# Patient Record
Sex: MALE | Race: WHITE | URBAN - METROPOLITAN AREA
[De-identification: names, ages, dates, MRNs, and addresses within clinical notes are randomized per-mention and may not be internally consistent; named-entity substitution may affect disease eponyms.]

---

## 2017-04-07 ENCOUNTER — HOSPITAL ENCOUNTER (EMERGENCY)
Facility: CLINIC | Age: 42
Discharge: HOME | End: 2017-04-07
Attending: EMERGENCY MEDICINE | Admitting: EMERGENCY MEDICINE
Payer: COMMERCIAL

## 2017-04-07 VITALS
HEIGHT: 74 IN | BODY MASS INDEX: 23.03 KG/M2 | WEIGHT: 179.45 LBS | RESPIRATION RATE: 16 BRPM | OXYGEN SATURATION: 100 % | HEART RATE: 84 BPM | SYSTOLIC BLOOD PRESSURE: 140 MMHG | TEMPERATURE: 98.1 F | DIASTOLIC BLOOD PRESSURE: 80 MMHG

## 2017-04-07 DIAGNOSIS — R07.89 ATYPICAL CHEST PAIN: ICD-10-CM

## 2017-04-07 LAB
ALBUMIN SERPL-MCNC: 4.4 G/DL (ref 3.4–5)
ALP SERPL-CCNC: 54 U/L (ref 40–150)
ALT SERPL W P-5'-P-CCNC: 36 U/L (ref 0–70)
ANION GAP SERPL CALCULATED.3IONS-SCNC: 6 MMOL/L (ref 3–14)
AST SERPL W P-5'-P-CCNC: 22 U/L (ref 0–45)
BASOPHILS # BLD AUTO: 0.1 10E9/L (ref 0–0.2)
BASOPHILS NFR BLD AUTO: 0.6 %
BILIRUB SERPL-MCNC: 0.5 MG/DL (ref 0.2–1.3)
BUN SERPL-MCNC: 10 MG/DL (ref 7–30)
CALCIUM SERPL-MCNC: 9 MG/DL (ref 8.5–10.1)
CHLORIDE SERPL-SCNC: 103 MMOL/L (ref 94–109)
CO2 SERPL-SCNC: 32 MMOL/L (ref 20–32)
CREAT SERPL-MCNC: 0.68 MG/DL (ref 0.66–1.25)
D DIMER PPP FEU-MCNC: NORMAL UG/ML FEU (ref 0–0.5)
DIFFERENTIAL METHOD BLD: NORMAL
EOSINOPHIL # BLD AUTO: 0.2 10E9/L (ref 0–0.7)
EOSINOPHIL NFR BLD AUTO: 2.2 %
ERYTHROCYTE [DISTWIDTH] IN BLOOD BY AUTOMATED COUNT: 12.1 % (ref 10–15)
GFR SERPL CREATININE-BSD FRML MDRD: NORMAL ML/MIN/1.7M2
GLUCOSE SERPL-MCNC: 93 MG/DL (ref 70–99)
HCT VFR BLD AUTO: 44.6 % (ref 40–53)
HGB BLD-MCNC: 15.6 G/DL (ref 13.3–17.7)
IMM GRANULOCYTES # BLD: 0 10E9/L (ref 0–0.4)
IMM GRANULOCYTES NFR BLD: 0.1 %
INTERPRETATION ECG - MUSE: NORMAL
LYMPHOCYTES # BLD AUTO: 3.4 10E9/L (ref 0.8–5.3)
LYMPHOCYTES NFR BLD AUTO: 39.7 %
MCH RBC QN AUTO: 30.9 PG (ref 26.5–33)
MCHC RBC AUTO-ENTMCNC: 35 G/DL (ref 31.5–36.5)
MCV RBC AUTO: 88 FL (ref 78–100)
MONOCYTES # BLD AUTO: 0.5 10E9/L (ref 0–1.3)
MONOCYTES NFR BLD AUTO: 5.8 %
NEUTROPHILS # BLD AUTO: 4.4 10E9/L (ref 1.6–8.3)
NEUTROPHILS NFR BLD AUTO: 51.6 %
NRBC # BLD AUTO: 0 10*3/UL
NRBC BLD AUTO-RTO: 0 /100
NT-PROBNP SERPL-MCNC: 44 PG/ML (ref 0–450)
PLATELET # BLD AUTO: 262 10E9/L (ref 150–450)
POTASSIUM SERPL-SCNC: 3.4 MMOL/L (ref 3.4–5.3)
PROT SERPL-MCNC: 7.9 G/DL (ref 6.8–8.8)
RBC # BLD AUTO: 5.05 10E12/L (ref 4.4–5.9)
SODIUM SERPL-SCNC: 141 MMOL/L (ref 133–144)
TROPONIN I SERPL-MCNC: NORMAL UG/L (ref 0–0.04)
TROPONIN I SERPL-MCNC: NORMAL UG/L (ref 0–0.04)
TSH SERPL DL<=0.05 MIU/L-ACNC: 2.35 MU/L (ref 0.4–4)
WBC # BLD AUTO: 8.5 10E9/L (ref 4–11)

## 2017-04-07 PROCEDURE — 93005 ELECTROCARDIOGRAM TRACING: CPT

## 2017-04-07 PROCEDURE — 85379 FIBRIN DEGRADATION QUANT: CPT | Performed by: EMERGENCY MEDICINE

## 2017-04-07 PROCEDURE — 93005 ELECTROCARDIOGRAM TRACING: CPT | Mod: 76

## 2017-04-07 PROCEDURE — 83880 ASSAY OF NATRIURETIC PEPTIDE: CPT | Performed by: EMERGENCY MEDICINE

## 2017-04-07 PROCEDURE — 25000128 H RX IP 250 OP 636: Performed by: EMERGENCY MEDICINE

## 2017-04-07 PROCEDURE — 80053 COMPREHEN METABOLIC PANEL: CPT | Performed by: EMERGENCY MEDICINE

## 2017-04-07 PROCEDURE — 96360 HYDRATION IV INFUSION INIT: CPT

## 2017-04-07 PROCEDURE — 85025 COMPLETE CBC W/AUTO DIFF WBC: CPT | Performed by: EMERGENCY MEDICINE

## 2017-04-07 PROCEDURE — 84484 ASSAY OF TROPONIN QUANT: CPT | Performed by: EMERGENCY MEDICINE

## 2017-04-07 PROCEDURE — 25000132 ZZH RX MED GY IP 250 OP 250 PS 637: Performed by: EMERGENCY MEDICINE

## 2017-04-07 PROCEDURE — 99285 EMERGENCY DEPT VISIT HI MDM: CPT | Mod: 25

## 2017-04-07 PROCEDURE — 84443 ASSAY THYROID STIM HORMONE: CPT | Performed by: EMERGENCY MEDICINE

## 2017-04-07 RX ORDER — SODIUM CHLORIDE 9 MG/ML
1000 INJECTION, SOLUTION INTRAVENOUS CONTINUOUS
Status: DISCONTINUED | OUTPATIENT
Start: 2017-04-07 | End: 2017-04-08 | Stop reason: HOSPADM

## 2017-04-07 RX ORDER — LORAZEPAM 0.5 MG/1
0.5 TABLET ORAL ONCE
Status: COMPLETED | OUTPATIENT
Start: 2017-04-07 | End: 2017-04-07

## 2017-04-07 RX ADMIN — SODIUM CHLORIDE 1000 ML: 9 INJECTION, SOLUTION INTRAVENOUS at 20:27

## 2017-04-07 RX ADMIN — LORAZEPAM 0.5 MG: 0.5 TABLET ORAL at 20:26

## 2017-04-07 ASSESSMENT — ENCOUNTER SYMPTOMS
LIGHT-HEADEDNESS: 1
CHEST TIGHTNESS: 1
SHORTNESS OF BREATH: 1
NUMBNESS: 1
DIAPHORESIS: 0
PALPITATIONS: 0

## 2017-04-07 NOTE — ED AVS SNAPSHOT
Emergency Department    [address] [facility] [place] [address]    Phone:  [phone]    Fax:  [phone]                                       Hugh Arambula   MRN: [id number]    Department:   Emergency Department   Date of Visit:  4/7/2017           Patient Information     Date Of Birth          1975        Your diagnoses for this visit were:     Atypical chest pain        You were seen by Lupe Fuentes MD.      Follow-up Information     Follow up with Rodney De Jesus MD.    Specialty:  Cardiology    Why:  any provider    Contact information:     [facility]  [address]  [facility] [address] [phone]          Follow up with  Emergency Department.    Specialty:  EMERGENCY MEDICINE    Why:  As needed, If symptoms worsen    Contact information:    [address] [facility] [id number] [phone]        Discharge Instructions       Discharge Instructions  Chest Pain    You have been seen today for chest pain or discomfort.  At this time, your doctor has found no signs that your chest pain is due to a serious or life-threatening condition, (or you have declined more testing and/or admission to the hospital). However, sometimes there is a serious problem that does not show up right away. Your evaluation today may not be complete and you may need further testing and evaluation.     You need to follow-up with your regular doctor within 3 days.    Return to the Emergency Department if:    Your chest pain changes, gets worse, starts to happen more often, or comes with less activity.    You are short of breath.    You get very weak or tired.    You pass out or faint.    You have any new symptoms, like fever, cough, numb legs, or you cough up blood.    You have anything else that worries you.    Until you follow-up with your regular doctor please do the following:    Take one aspirin daily unless you have an allergy or are told not to by your  doctor.    If a stress test appointment has been made, go to the appointment.    If you have questions, contact your regular doctor.    If your doctor today has told you to follow-up with your regular doctor, it is very important that you make an appointment with your clinic and go to the appointment.  If you do not follow-up with your primary doctor, it may result in missing an important development which could result in permanent injury or disability and/or lasting pain.  If there is any problem keeping your appointment, call your doctor or return to the Emergency Department.    If you were given a prescription for medicine here today, be sure to read all of the information (including the package insert) that comes with your prescription.  This will include important information about the medicine, its side effects, and any warnings that you need to know about.  The pharmacist who fills the prescription can provide more information and answer questions you may have about the medicine.  If you have questions or concerns that the pharmacist cannot address, please call or return to the Emergency Department.     Opioid Medication Information    Pain medications are among the most commonly prescribed medicines, so we are including this information for all our patients. If you did not receive pain medication or get a prescription for pain medicine, you can ignore it.     You may have been given a prescription for an opioid (narcotic) pain medicine and/or have received a pain medicine while here in the Emergency Department. These medicines can make you drowsy or impaired. You must not drive, operate dangerous equipment, or engage in any other dangerous activities while taking these medications. If you drive while taking these medications, you could be arrested for DUI, or driving under the influence. Do not drink any alcohol while you are taking these medications.     Opioid pain medications can cause addiction. If you  have a history of chemical dependency of any type, you are at a higher risk of becoming addicted to pain medications.  Only take these prescribed medications to treat your pain when all other options have been tried. Take it for as short a time and as few doses as possible. Store your pain pills in a secure place, as they are frequently stolen and provide a dangerous opportunity for children or visitors in your house to start abusing these powerful medications. We will not replace any lost or stolen medicine.  As soon as your pain is better, you should flush all your remaining medication.     Many prescription pain medications contain Tylenol  (acetaminophen), including Vicodin , Tylenol #3 , Norco , Lortab , and Percocet .  You should not take any extra pills of Tylenol  if you are using these prescription medications or you can get very sick.  Do not ever take more than 3000 mg of acetaminophen in any 24 hour period.    All opioids tend to cause constipation. Drink plenty of water and eat foods that have a lot of fiber, such as fruits, vegetables, prune juice, apple juice and high fiber cereal.  Take a laxative if you don t move your bowels at least every other day. Miralax , Milk of Magnesia, Colace , or Senna  can be used to keep you regular.      Remember that you can always come back to the Emergency Department if you are not able to see your regular doctor in the amount of time listed above, if you get any new symptoms, or if there is anything that worries you.          24 Hour Appointment Hotline       To make an appointment at any [facility] call [phone] ([phone]). If you don't have a family doctor or clinic, we will help you find one. [facility] are conveniently located to serve the needs of you and your family.          ED Discharge Orders     Exercise Stress Echocardiogram       The supervising Cardiologist may change the type of echocardiogram requested to answer a specific  clinical question based on existing protocols in the Echocardiography laboratory.    Use of contrast is at the discretion of the supervising Cardiologist.            Follow-Up with Cardiologist                    Review of your medicines      Our records show that you are taking the medicines listed below. If these are incorrect, please call your family doctor or clinic.        Dose / Directions Last dose taken    acetaZOLAMIDE 125 MG tablet   Commonly known as:  DIAMOX   Quantity:  20 tablet        Take 125 mg  Po twice daily; beginning either the day before (preferred) or on the day of ascent; may be discontinued after staying at the same elevation for 2-3 days or if descent initiated.   Refills:  0        atovaquone-proguanil 250-100 MG per tablet   Commonly known as:  MALARONE   Dose:  1 tablet   Quantity:  130 tablet        Take 1 tablet by mouth daily. START 1 DAY PRIOR TO MALARIA AREA AND CONTINUE UNTIL 7 DAYS AFTER LEAVING  MALARIA AREA   Refills:  0        azithromycin 500 MG tablet   Commonly known as:  ZITHROMAX   Quantity:  30 tablet        Take 1 tab po daily x 3 days prn traveler diarrhea for [place].   Refills:  0        ciprofloxacin 500 MG tablet   Commonly known as:  CIPRO   Quantity:  30 tablet        Take 1 tab po BID x 3 days prn traveler diarrhea   Refills:  0        EPINEPHrine 0.3 MG/0.3ML injection   Commonly known as:  EPIPEN 2-ОЛЕГ   Dose:  0.3 mg   Quantity:  2 each        Inject 0.3 mLs into the muscle once as needed for anaphylaxis for 1 dose.   Refills:  1        * mefloquine 250 MG tablet   Commonly known as:  LARIAM   Quantity:  4 tablet        1 tab po weekly. Start 2 weeks before travel, weekly during travel, and weekly for 4 weeks after travel.   Refills:  0        * mefloquine 250 MG tablet   Commonly known as:  LARIAM   Quantity:  28 tablet        on the same day each week. Start 2 weeks before entering malaria area and continue weekly while there and for 4 weeks after leaving  malaria risk area.   Refills:  0        scopolamine 1.5 MG patch 72 hr   Commonly known as:  TRANSDERM-SCOP   Dose:  1 patch   Quantity:  6 patch        Place 1 patch onto the skin every 72 hours as needed. Apply to hairless area behind one ear at least 4 hours before travel.   Refills:  0        * Notice:  This list has 2 medication(s) that are the same as other medications prescribed for you. Read the directions carefully, and ask your doctor or other care provider to review them with you.            Procedures and tests performed during your visit     Procedure/Test Number of Times Performed    CBC with platelets differential 1    Comprehensive metabolic panel 1    D dimer quantitative 1    EKG 12 lead 2    EKG 12-lead, tracing only 1    Nt probnp inpatient (BNP) 1    TSH 1    Troponin I 1    Troponin I (now) 1      Orders Needing Specimen Collection     None      Pending Results     Date and Time Order Name Status Description    4/7/2017 2210 EKG 12 lead Preliminary             Pending Culture Results     No orders found from 4/5/2017 to 4/8/2017.            Test Results From Your Hospital Stay        4/7/2017  7:59 PM      Component Results     Component Value Ref Range & Units Status    WBC 8.5 4.0 - 11.0 10e9/L Final    RBC Count 5.05 4.4 - 5.9 10e12/L Final    Hemoglobin 15.6 13.3 - 17.7 g/dL Final    Hematocrit 44.6 40.0 - 53.0 % Final    MCV 88 78 - 100 fl Final    MCH 30.9 26.5 - 33.0 pg Final    MCHC 35.0 31.5 - 36.5 g/dL Final    RDW 12.1 10.0 - 15.0 % Final    Platelet Count 262 150 - 450 10e9/L Final    Diff Method Automated Method  Final    % Neutrophils 51.6 % Final    % Lymphocytes 39.7 % Final    % Monocytes 5.8 % Final    % Eosinophils 2.2 % Final    % Basophils 0.6 % Final    % Immature Granulocytes 0.1 % Final    Nucleated RBCs 0 0 /100 Final    Absolute Neutrophil 4.4 1.6 - 8.3 10e9/L Final    Absolute Lymphocytes 3.4 0.8 - 5.3 10e9/L Final    Absolute Monocytes 0.5 0.0 - 1.3 10e9/L Final     Absolute Eosinophils 0.2 0.0 - 0.7 10e9/L Final    Absolute Basophils 0.1 0.0 - 0.2 10e9/L Final    Abs Immature Granulocytes 0.0 0 - 0.4 10e9/L Final    Absolute Nucleated RBC 0.0  Final         4/7/2017  8:23 PM      Component Results     Component Value Ref Range & Units Status    Troponin I ES  0.000 - 0.045 ug/L Final    <0.015  The 99th percentile for upper reference range is 0.045 ug/L.  Troponin values in   the range of 0.045 - 0.120 ug/L may be associated with risks of adverse   clinical events.           4/7/2017  8:23 PM      Component Results     Component Value Ref Range & Units Status    Sodium 141 133 - 144 mmol/L Final    Potassium 3.4 3.4 - 5.3 mmol/L Final    Chloride 103 94 - 109 mmol/L Final    Carbon Dioxide 32 20 - 32 mmol/L Final    Anion Gap 6 3 - 14 mmol/L Final    Glucose 93 70 - 99 mg/dL Final    Urea Nitrogen 10 7 - 30 mg/dL Final    Creatinine 0.68 0.66 - 1.25 mg/dL Final    GFR Estimate >90  Non  GFR Calc   >60 mL/min/1.7m2 Final    GFR Estimate If Black >90   GFR Calc   >60 mL/min/1.7m2 Final    Calcium 9.0 8.5 - 10.1 mg/dL Final    Bilirubin Total 0.5 0.2 - 1.3 mg/dL Final    Albumin 4.4 3.4 - 5.0 g/dL Final    Protein Total 7.9 6.8 - 8.8 g/dL Final    Alkaline Phosphatase 54 40 - 150 U/L Final    ALT 36 0 - 70 U/L Final    AST 22 0 - 45 U/L Final         4/7/2017  8:29 PM      Component Results     Component Value Ref Range & Units Status    TSH 2.35 0.40 - 4.00 mU/L Final         4/7/2017  8:23 PM      Component Results     Component Value Ref Range & Units Status    N-Terminal Pro BNP Inpatient 44 0 - 450 pg/mL Final    Reference range shown and results flagged as abnormal are suggested inpatient   cut points for confirming diagnosis if CHF in an acute setting. Establishing   a   baseline value for each individual patient is useful for follow-up. An   inpatient or emergency department NT-proPBNP <300 pg/mL effectively rules out   acute CHF, with 99%  negative predictive value.  The outpatient non-acute reference range for ruling out CHF is:   0-125 pg/mL (age 18 to less than 75)   0-450 pg/mL (age 75 yrs and older)           4/7/2017  9:17 PM      Component Results     Component Value Ref Range & Units Status    D Dimer  0.0 - 0.50 ug/ml FEU Final    <0.3  This D-dimer assay is intended for use in conjuntion with a clinical pretest   probability assessment model to exclude pulmonary embolism (PE) and as an aid   in the diagnosis of deep venous thrombosis (DVT) in outpatients suspected of PE   or DVT. The cut-off value is 0.5 g/mL FEU.           4/7/2017 10:38 PM      Component Results     Component Value Ref Range & Units Status    Troponin I ES  0.000 - 0.045 ug/L Final    <0.015  The 99th percentile for upper reference range is 0.045 ug/L.  Troponin values in   the range of 0.045 - 0.120 ug/L may be associated with risks of adverse   clinical events.                  Clinical Quality Measure: Blood Pressure Screening     Your blood pressure was checked while you were in the emergency department today. The last reading we obtained was  BP: 164/81 . Please read the guidelines below about what these numbers mean and what you should do about them.  If your systolic blood pressure (the top number) is less than 120 and your diastolic blood pressure (the bottom number) is less than 80, then your blood pressure is normal. There is nothing more that you need to do about it.  If your systolic blood pressure (the top number) is 120-139 or your diastolic blood pressure (the bottom number) is 80-89, your blood pressure may be higher than it should be. You should have your blood pressure rechecked within a year by a primary care provider.  If your systolic blood pressure (the top number) is 140 or greater or your diastolic blood pressure (the bottom number) is 90 or greater, you may have high blood pressure. High blood pressure is treatable, but if left untreated over time  "it can put you at risk for heart attack, stroke, or kidney failure. You should have your blood pressure rechecked by a primary care provider within the next 4 weeks.  If your provider in the emergency department today gave you specific instructions to follow-up with your doctor or provider even sooner than that, you should follow that instruction and not wait for up to 4 weeks for your follow-up visit.        Thank you for choosing [facility]       Thank you for choosing [facility] for your care. Our goal is always to provide you with excellent care. Hearing back from our patients is one way we can continue to improve our services. Please take a few minutes to complete the written survey that you may receive in the mail after you visit with us. Thank you!        [facility] Information     [facility] lets you send messages to your doctor, view your test results, renew your prescriptions, schedule appointments and more. To sign up, go to [email or web address] . Click on \"Log in\" on the left side of the screen, which will take you to the Welcome page. Then click on \"Sign up Now\" on the right side of the page.     You will be asked to enter the access code listed below, as well as some personal information. Please follow the directions to create your username and password.     Your access code is: [email or web address]  Expires: 2017 10:51 PM     Your access code will  in 90 days. If you need help or a new code, please call your [facility] clinic or [phone].        Care EveryWhere ID     This is your Care EveryWhere ID. This could be used by other organizations to access your [facility] medical records  [id number]        After Visit Summary       This is your record. Keep this with you and show to your community pharmacist(s) and doctor(s) at your next visit.                  "

## 2017-04-07 NOTE — ED AVS SNAPSHOT
Emergency Department    [address] [place] [address]    Phone:  [phone]    Fax:  [phone]                                       Hugh Arambula   MRN: [id number]    Department:   Emergency Department   Date of Visit:  4/7/2017           After Visit Summary Signature Page     I have received my discharge instructions, and my questions have been answered. I have discussed any challenges I see with this plan with the nurse or doctor.    ..........................................................................................................................................  Patient/Patient Representative Signature      ..........................................................................................................................................  Patient Representative Print Name and Relationship to Patient    ..................................................               ................................................  Date                                            Time    ..........................................................................................................................................  Reviewed by Signature/Title    ...................................................              ..............................................  Date                                                            Time

## 2017-04-08 LAB — INTERPRETATION ECG - MUSE: NORMAL

## 2017-04-08 NOTE — ED PROVIDER NOTES
History     Chief Complaint:  Chest tightness    HPI   Hugh Arambula is a 41 year old male who presents with left sided chest tightness with associated dizziness and left arm numbness, and shortness of breath that acutely began yesterday at 0730 in the morning. This pain began 10 minutes after finding out his mother passed away from a MI. This evening the patient re-developed shortness of breath and dizziness thus prompting him to come to the emergency department. To note, the patient has been seen in the emergency department three months ago for similar symptoms. At that time he had unremarkable workup and his symptoms were thought to be secondary to anxiety. The patient does not report any diaphoresis, cough, chest pain, leg swelling, palpitations, or other related symptoms. He voices no other concerns at this time.     Cardiac/PE/DVT Risk Factors:  The patient has no history of hypertension, hyperlipidemia, diabetes, or smoking. He reports a very recent family history of heart disease. The patient doesn't have any personal or familial history of PE, DVT, or clotting disorder. The patient reports no recent travel, surgery, or other immobilizations.     Allergies:  No known drug allergies      Medications:    Malarone   Lariam   Cipro  Zithromax   Epipen  Transderm  Diamox     Past Medical History:    The patient has no medical history.     Past Surgical History:    History reviewed. No pertinent surgical history.     Family History:    History reviewed. No pertinent family history.      Social History:  The patient is not a smoker. The patient uses alcohol.   Marital Status:  Single [1]     Review of Systems   Constitutional: Negative for diaphoresis.   Respiratory: Positive for chest tightness and shortness of breath.    Cardiovascular: Negative for chest pain, palpitations and leg swelling.   Neurological: Positive for light-headedness and numbness.   10 point review of systems performed and is negative except as  "above and in HPI.     Physical Exam     Patient Vitals for the past 24 hrs:   BP Temp Temp src Pulse Heart Rate Resp SpO2 Height Weight   04/07/17 1930 - - - - 85 18 99 % - -   04/07/17 1921 164/81 98.1  F (36.7  C) Oral 83 - 18 100 % 1.88 m (6' 2\") 81.4 kg (179 lb 7.3 oz)      Physical Exam  General: Resting on the gurney, appears comfortable  Head:  The scalp, face, and head appear normal  Mouth/Throat: Mucus membranes are moist  CV:  Regular rate    Normal S1 and S2  No pathological murmur   Resp:  Breath sounds clear and equal bilaterally    Non-labored, no retractions or accessory muscle use    No coarseness    No wheezing   GI:  Abdomen is soft, no rigidity    No tenderness to palpation  MS:  Normal motor assessment of all extremities.    Good capillary refill noted.  Neuro:   Speech is normal and fluent. No apparent deficit.  Psych: Awake. Alert.  Normal affect.      Appropriate interactions.     Emergency Department Course   ECG (19:24:05):  Rate 75 bpm. [place] interval 140. QRS duration 96. QT/QTc 368/410. P-R-T axes 69/80/9. Normal sinus rhythm possible left atrial enlargement nonspecific ST abnormality abnormal ECG. Interpreted at 1930 by Lupe Fuentes MD.     ECG (22:25:45):  Rate 66 bpm. NY interval 142. QRS duration 98. QT/QTc 402/421. P-R-T axes 66/76/4. Normal sinus rhythm possible left atrial enlargement left ventricular hypertrophy nonspecific ST abnormality abnormal ECG. Interpreted at 2230 by Lupe Fuentes MD.     Laboratory:  CBC: WNL (WBC 8.5, HGB 15.6, )   1935Troponin: WNL <0.015  CMP: WNL (Creatinine 0.68)  TSH 2.35 (WNL)  BNP: 44 (WNL)  D-Dimer: <0.3 (WNL)  1935Troponin: WNL <0.015      Interventions:  2026 Ativan 0.5 mg IV   Normal Saline Bolus 1L     Emergency Department Course:  Past medical records, nursing notes, and vitals reviewed. I performed an exam of the patient and obtained history, as documented above.  Blood was obtained. A peripheral IV was established.   "   Findings and plan explained to the Patient. Patient discharged home with instructions regarding supportive care, medications, and reasons to return. The importance of close follow-up was reviewed.     Impression & Plan      Medical Decision Making:  Hugh Arambula presents with chest pain.  The work up in the Emergency Department is negative.  The differential diagnosis of chest pain is broad and includes life threatening etiologies such as Acute coronary syndrome, Myocardial infarction, Pulmonary Embolism, and Acute Aortic Dissection.  Other causes may include pneumonia, pneumothorax, pericarditis, pleurisy, and esophageal spasm. No serious etiology for the chest pain was detected today during this visit. I did discuss the posibility of takotsubo with him. Initial workup is unremarkable. He will return for new or worsening symptoms. He is comfortable with this plan and was discharged in good condition.     Close follow up with primary care is indicated should the pain continue, as further work up and stress test may be performed; this was made clear to Hugh, who understands.    Diagnosis:    ICD-10-CM    1. Atypical chest pain R07.89 Follow-Up with Cardiologist     Exercise Stress Echocardiogram        Disposition:  discharged to home    Grecia ALEJO, am serving as a scribe at 7:30 PM on 4/7/2017 to document services personally performed by Lupe Fuentes MD based on my observations and the provider's statements to me.         Lupe Fuentes MD  04/09/17 0910

## 2017-04-10 PROBLEM — R07.89 ATYPICAL CHEST PAIN: Status: ACTIVE | Noted: 2017-04-10

## 2017-04-11 ENCOUNTER — HOSPITAL ENCOUNTER (OUTPATIENT)
Dept: CARDIOLOGY | Facility: CLINIC | Age: 42
Discharge: HOME | End: 2017-04-11
Attending: EMERGENCY MEDICINE | Admitting: EMERGENCY MEDICINE
Payer: COMMERCIAL

## 2017-04-11 DIAGNOSIS — R07.89 ATYPICAL CHEST PAIN: ICD-10-CM

## 2017-04-11 PROCEDURE — 93016 CV STRESS TEST SUPVJ ONLY: CPT | Performed by: INTERNAL MEDICINE

## 2017-04-11 PROCEDURE — 93325 DOPPLER ECHO COLOR FLOW MAPG: CPT | Mod: 26 | Performed by: INTERNAL MEDICINE

## 2017-04-11 PROCEDURE — 40000264 ECHO STRESS WITH OPTISON

## 2017-04-11 PROCEDURE — 93321 DOPPLER ECHO F-UP/LMTD STD: CPT | Mod: 26 | Performed by: INTERNAL MEDICINE

## 2017-04-11 PROCEDURE — 93018 CV STRESS TEST I&R ONLY: CPT | Performed by: INTERNAL MEDICINE

## 2017-04-11 PROCEDURE — 25500064 ZZH RX 255 OP 636: Performed by: EMERGENCY MEDICINE

## 2017-04-11 PROCEDURE — 93350 STRESS TTE ONLY: CPT | Mod: 26 | Performed by: INTERNAL MEDICINE

## 2017-04-11 RX ADMIN — HUMAN ALBUMIN MICROSPHERES AND PERFLUTREN 5 ML: 10; .22 INJECTION, SOLUTION INTRAVENOUS at 16:14

## 2017-04-12 ENCOUNTER — OFFICE VISIT (OUTPATIENT)
Dept: CARDIOLOGY | Facility: CLINIC | Age: 42
End: 2017-04-12
Attending: EMERGENCY MEDICINE
Payer: COMMERCIAL

## 2017-04-12 VITALS
SYSTOLIC BLOOD PRESSURE: 128 MMHG | HEIGHT: 74 IN | HEART RATE: 80 BPM | WEIGHT: 177.5 LBS | DIASTOLIC BLOOD PRESSURE: 86 MMHG | BODY MASS INDEX: 22.78 KG/M2

## 2017-04-12 DIAGNOSIS — R07.89 ATYPICAL CHEST PAIN: ICD-10-CM

## 2017-04-12 DIAGNOSIS — Z82.49 FAMILY HISTORY OF ISCHEMIC HEART DISEASE: ICD-10-CM

## 2017-04-12 DIAGNOSIS — Z13.6 CARDIOVASCULAR SCREENING; LDL GOAL LESS THAN 160: Primary | ICD-10-CM

## 2017-04-12 PROCEDURE — 99203 OFFICE O/P NEW LOW 30 MIN: CPT | Performed by: INTERNAL MEDICINE

## 2017-04-12 RX ORDER — MULTIVIT WITH MINERALS/LUTEIN
1000 TABLET ORAL DAILY
COMMUNITY

## 2017-04-12 RX ORDER — ASPIRIN 325 MG
TABLET, DELAYED RELEASE (ENTERIC COATED) ORAL DAILY
COMMUNITY

## 2017-04-12 RX ORDER — LANOLIN ALCOHOL/MO/W.PET/CERES
CREAM (GRAM) TOPICAL
COMMUNITY

## 2017-04-12 NOTE — LETTER
4/12/2017    Lupe Fuentes MD  [facility]  [address] [place] [address]    RE: Hugh Arambula       Dear Colleague,    I had the pleasure of seeing Hugh Arambula in the [facility].    REFERRING PHYSICIAN:  He does not have a referring physician listed.      Mr. Arambula is a pleasant 41-year-old male who had been seen in the Emergency Department on 04/07 with chest tightness as well as some shortness of breath.  He had unfortunately just been told earlier that day that his mother had passed away from a myocardial infarction.  Mr. Arambula tells me has had symptoms in the past of some palpitations related to his heart and fast heart rate, but never a real description of chest pains before.  He was fairly active up until his daughter was born, exercising on a regular basis.  In the last 8 months since she was born his exercise routine has somewhat fallen off.  Chest pain is a new experience for him.  He has not had anything like this in the past with exertion or at rest.  He has no known risk factors of high blood pressure, high cholesterol, diabetes, kidney disease or previous smoking history.  He did have an electrocardiogram performed on 04/07 which I have reviewed.  This essentially demonstrates a normal sinus rhythm.  There is some minor T-wave abnormalities in lead III and aVF as well as an incomplete right bundle branch block or RSR pattern in V1.  I do not have a previous available for comparison.  His stress echo, he was able to work through a high cardiac workload at 15 minutes on a standard [place] protocol.  He did have a maximum of 1 mm of ST segment depression noted in both the inferior and anterior leads; however, this recovered within 1 minute of the recovery period.  He did not experience any reproducible symptoms during the test.  His blood pressure was normal and the stress echocardiogram portion was also normal without evidence of stress-induced wall motion  abnormality.  He was recommended to follow up in clinic today to discuss further his symptoms and any other possible evaluation.  It was suggested to him he may benefit from a heart scan.      PHYSICAL EXAMINATION:   VITAL SIGNS:  On exam today, his blood pressure was 128/86, pulse is 80, weight 177, body mass index of 22.   NECK:  Carotid upstrokes are brisk without bruit.   CARDIOVASCULAR:  Tones are regular.  I do not appreciate a murmur, gallop or rub.   LUNGS:  Clear posteriorly.   EXTREMITIES:  He has no peripheral edema with strong and symmetric pulses in the upper and lower extremities.     Outpatient Encounter Prescriptions as of 4/12/2017   Medication Sig Dispense Refill     aspirin 325 MG EC tablet Take by mouth daily       Omega-3 Fatty Acids (OMEGA 3 PO) Take by mouth daily       Cyanocobalamin (VITAMIN B-12 PO) Vitamin B12 patch, 2 days month       Cholecalciferol (VITAMIN D3) 1000 UNITS CAPS Take by mouth daily       melatonin 3 MG tablet Take by mouth nightly as needed for sleep       Probiotic Product (PROBIOTIC ADVANCED PO) Take by mouth daily       ascorbic acid (VITAMIN C) 1000 MG TABS Take 1,000 mg by mouth daily       [DISCONTINUED] ranitidine (ZANTAC) 150 MG tablet Take by mouth daily       [DISCONTINUED] atovaquone-proguanil (MALARONE) 250-100 MG per tablet Take 1 tablet by mouth daily. START 1 DAY PRIOR TO MALARIA AREA AND CONTINUE UNTIL 7 DAYS AFTER LEAVING  MALARIA AREA 130 tablet 0     [DISCONTINUED] mefloquine (LARIAM) 250 MG tablet on the same day each week. Start 2 weeks before entering malaria area and continue weekly while there and for 4 weeks after leaving malaria risk area. 28 tablet 0     [DISCONTINUED] ciprofloxacin (CIPRO) 500 MG tablet Take 1 tab po BID x 3 days prn traveler diarrhea     30 tablet 0     [DISCONTINUED] mefloquine (LARIAM) 250 MG tablet 1 tab po weekly. Start 2 weeks before travel, weekly during travel, and weekly for 4 weeks after travel.       4 tablet 0      [DISCONTINUED] azithromycin (ZITHROMAX) 500 MG tablet Take 1 tab po daily x 3 days prn traveler diarrhea for [place].   30 tablet 0     [DISCONTINUED] EPINEPHrine (EPIPEN 2-ОЛЕГ) 0.3 MG/0.3ML injection Inject 0.3 mLs into the muscle once as needed for anaphylaxis for 1 dose. 2 each 1     [DISCONTINUED] scopolamine (TRANSDERM-SCOP) 1.5 MG patch 72 hr Place 1 patch onto the skin every 72 hours as needed. Apply to hairless area behind one ear at least 4 hours before travel. 6 patch 0     [DISCONTINUED] acetaZOLAMIDE (DIAMOX) 125 MG tablet Take 125 mg  Po twice daily; beginning either the day before (preferred) or on the day of ascent; may be discontinued after staying at the same elevation for 2-3 days or if descent initiated. 20 tablet 0     No facility-administered encounter medications on file as of 4/12/2017.       SUMMARY:  Mr. Arambula is a very pleasant 41-year-old male with atypical chest pain symptoms, mildly abnormal stress ECG but normal stress echocardiogram.  His baseline ECG is mildly abnormal as well.  He did exercise to a high cardiac workload without reproducible symptoms.  Overall, the testing in his history suggests low risk for future cardiovascular events.  This stress echo indicates that likelihood of his current chest pain symptoms are very low to be related to an ongoing active cardiac problem.  However, we do see patients often after a sudden family heart event and there is a second question that arises as to what are his future risks for development of cardiovascular disease and we did talk about that today. Heart scan may be a very good option for him to get an idea of what his vascular age is and see if there is any evidence of subclinical coronary disease.  I did offer him a CT calcium scoring for this reason and he would like to undergo this, so we will go ahead and order it for him.  I did explain that it is likely not covered by insurance, but gave him an estimated cost today as well as a  followup visit to go over its results.  We did discuss a little bit about his mom and her symptoms preceding her passing.  We also talked about lifestyle and ways to reduce risks for future cardiovascular events.        Please feel free to contact me with any questions you have in regards to his care.     Again, thank you for allowing me to participate in the care of your patient.      Sincerely,    Arpita Emmanuel,      [facility]

## 2017-04-12 NOTE — MR AVS SNAPSHOT
After Visit Summary   4/12/2017    Hugh Arambula    MRN: [id number]           Patient Information     Date Of Birth          1975        Visit Information        Provider Department      4/12/2017 3:15 PM Arpita Emmanuel DO [facility]        Today's Diagnoses     CARDIOVASCULAR SCREENING; LDL GOAL LESS THAN 160    -  1    Atypical chest pain        Family history of ischemic heart disease           Follow-ups after your visit        Additional Services     Follow-Up with Cardiac Advanced Practice Provider                 Your next 10 appointments already scheduled     Apr 24, 2017  8:00 AM CDT   CT CALCIUM SCREENING with SCICT1   [facility] (Cardiovascular Imaging at [facility])    [address]  [address]  [place] [id number] [phone]           It is best to avoid caffeine on the day of your test.  Be sure to tell your doctor:   If there s any chance you are pregnant.   If you have any special needs.  Please wear loose clothing, such as a sweat suit or jogging clothes. Avoid snaps, zippers and other metal. We may ask you to undress and put on a hospital gown.  If you have any questions, please call the Imaging Department where you will have your exam.            May 01, 2017  8:10 AM CDT   Return Visit with YOLANDA Mauricio CNP   [facility] ([facility])    [address]  [place] [address] [phone]              Future tests that were ordered for you today     Open Future Orders        Priority Expected Expires Ordered    Follow-Up with Cardiac Advanced Practice Provider Routine 4/19/2017 4/12/2018 4/12/2017    CT Coronary Calcium Scan Routine 4/13/2017 4/12/2018 4/12/2017    ECHO STRESS WITH OPTISON Routine  4/7/2018 4/7/2017            Who to contact     If you have questions or need follow up  "information about today's clinic visit or your schedule please contact [facility] [place] directly at [phone].  Normal or non-critical lab and imaging results will be communicated to you by MyChart, letter or phone within 4 business days after the clinic has received the results. If you do not hear from us within 7 days, please contact the clinic through [employer] or phone. If you have a critical or abnormal lab result, we will notify you by phone as soon as possible.  Submit refill requests through [employer] or call your pharmacy and they will forward the refill request to us. Please allow 3 business days for your refill to be completed.          Additional Information About Your Visit        [employer] Information     ZoomInfo lets you send messages to your doctor, view your test results, renew your prescriptions, schedule appointments and more. To sign up, go to [email or web address] . Click on \"Log in\" on the left side of the screen, which will take you to the Welcome page. Then click on \"Sign up Now\" on the right side of the page.     You will be asked to enter the access code listed below, as well as some personal information. Please follow the directions to create your username and password.     Your access code is: [email or web address]  Expires: 2017 10:51 PM     Your access code will  in 90 days. If you need help or a new code, please call your [place] clinic or [phone].        Care EveryWhere ID     This is your Care EveryWhere ID. This could be used by other organizations to access your [place] medical records  [id number]        Your Vitals Were     Pulse Height BMI (Body Mass Index)             80 1.88 m (6' 2\") 22.79 kg/m2          Blood Pressure from Last 3 Encounters:   17 128/86   17 140/80   12 120/72    Weight from Last 3 Encounters:   17 80.5 kg (177 lb 8 oz)   17 81.4 kg (179 lb 7.3 oz)   12 80.8 kg (178 lb 3.2 oz)    "           We Performed the Following     Follow-Up with Cardiologist        Primary Care Provider    None       No address on file        Thank you!     Thank you for choosing [facility] [place]  for your care. Our goal is always to provide you with excellent care. Hearing back from our patients is one way we can continue to improve our services. Please take a few minutes to complete the written survey that you may receive in the mail after your visit with us. Thank you!             Your Updated Medication List - Protect others around you: Learn how to safely use, store and throw away your medicines at www.disposemymeds.org.          This list is accurate as of: 4/12/17  4:12 PM.  Always use your most recent med list.                   Brand Name Dispense Instructions for use    ascorbic acid 1000 MG Tabs    vitamin C     Take 1,000 mg by mouth daily       aspirin 325 MG EC tablet      Take by mouth daily       melatonin 3 MG tablet      Take by mouth nightly as needed for sleep       OMEGA 3 PO      Take by mouth daily       PROBIOTIC ADVANCED PO      Take by mouth daily       VITAMIN B-12 PO      Vitamin B12 patch, 2 days month       vitamin D3 1000 UNITS Caps      Take by mouth daily       ZANTAC 150 MG tablet   Generic drug:  ranitidine      Take by mouth daily

## 2017-04-12 NOTE — PROGRESS NOTES
HPI and Plan:   See dictation    Orders Placed This Encounter   Procedures     CT Coronary Calcium Scan     Follow-Up with Cardiac Advanced Practice Provider       Orders Placed This Encounter   Medications     aspirin 325 MG EC tablet     Sig: Take by mouth daily     ranitidine (ZANTAC) 150 MG tablet     Sig: Take by mouth daily     Omega-3 Fatty Acids (OMEGA 3 PO)     Sig: Take by mouth daily     Cyanocobalamin (VITAMIN B-12 PO)     Sig: Vitamin B12 patch, 2 days month     Cholecalciferol (VITAMIN D3) 1000 UNITS CAPS     Sig: Take by mouth daily     melatonin 3 MG tablet     Sig: Take by mouth nightly as needed for sleep     Probiotic Product (PROBIOTIC ADVANCED PO)     Sig: Take by mouth daily     ascorbic acid (VITAMIN C) 1000 MG TABS     Sig: Take 1,000 mg by mouth daily       Medications Discontinued During This Encounter   Medication Reason     acetaZOLAMIDE (DIAMOX) 125 MG tablet Therapy completed     scopolamine (TRANSDERM-SCOP) 1.5 MG patch 72 hr Therapy completed     atovaquone-proguanil (MALARONE) 250-100 MG per tablet Therapy completed     azithromycin (ZITHROMAX) 500 MG tablet Stopped by Patient     ciprofloxacin (CIPRO) 500 MG tablet Stopped by Patient     EPINEPHrine (EPIPEN 2-ОЛЕГ) 0.3 MG/0.3ML injection Stopped by Patient     mefloquine (LARIAM) 250 MG tablet Stopped by Patient     mefloquine (LARIAM) 250 MG tablet Stopped by Patient         Encounter Diagnoses   Name Primary?     Atypical chest pain      CARDIOVASCULAR SCREENING; LDL GOAL LESS THAN 160 Yes     Family history of ischemic heart disease        CURRENT MEDICATIONS:  Current Outpatient Prescriptions   Medication Sig Dispense Refill     aspirin 325 MG EC tablet Take by mouth daily       ranitidine (ZANTAC) 150 MG tablet Take by mouth daily       Omega-3 Fatty Acids (OMEGA 3 PO) Take by mouth daily       Cyanocobalamin (VITAMIN B-12 PO) Vitamin B12 patch, 2 days month       Cholecalciferol (VITAMIN D3) 1000 UNITS CAPS Take by mouth daily   "     melatonin 3 MG tablet Take by mouth nightly as needed for sleep       Probiotic Product (PROBIOTIC ADVANCED PO) Take by mouth daily       ascorbic acid (VITAMIN C) 1000 MG TABS Take 1,000 mg by mouth daily         ALLERGIES     Allergies   Allergen Reactions     No Known Drug Allergies        PAST MEDICAL HISTORY:  Past Medical History:   Diagnosis Date     Atypical chest pain 4/10/2017       PAST SURGICAL HISTORY:  History reviewed. No pertinent surgical history.    FAMILY HISTORY:  Family History   Problem Relation Age of Onset     Hypertension Mother      Hyperlipidemia Mother        SOCIAL HISTORY:  Social History     Social History     Marital status:      Spouse name: N/A     Number of children: N/A     Years of education: N/A     Social History Main Topics     Smoking status: Never Smoker     Smokeless tobacco: None     Alcohol use Yes      Comment: 3 beers week     Drug use: No     Sexual activity: Yes     Partners: Female     Birth control/ protection: Condom     Other Topics Concern     None     Social History Narrative       Review of Systems:  Skin:          Eyes:  Positive for contacts    ENT:  Positive for   sore throat  Respiratory:  Positive for shortness of breath     Cardiovascular:  Negative;syncope or near-syncope;cyanosis;fatigue Positive for chest tightness, feels chest muscular discomfort, LH with anxiety  Gastroenterology: Positive for reflux    Genitourinary:         Musculoskeletal:  Positive for   rib area pain  Neurologic:  not assessed      Psychiatric:  Positive for anxiety states feels has panic attacks, mother passed away last week  Heme/Lymph/Imm:  Negative      Endocrine:  Negative        Physical Exam:  Vitals: /86 (BP Location: Left arm, Cuff Size: Adult Large)  Pulse 80  Ht 1.88 m (6' 2\")  Wt 80.5 kg (177 lb 8 oz)  BMI 22.79 kg/m2    Constitutional:           Skin:           Head:           Eyes:           ENT:           Neck:           Chest:         "     Cardiac:                    Abdomen:           Vascular:                                          Extremities and Back:                 Neurological:                 CC  Lupe Fuentes MD  [facility]  [address]  [place] [address]

## 2017-04-13 NOTE — PROGRESS NOTES
REFERRING PHYSICIAN:  He does not have a referring physician listed.      HISTORY OF PRESENT ILLNESS:  Mr. Arambula is a pleasant 41-year-old male who had been seen in the Emergency Department on 04/07 with chest tightness as well as some shortness of breath.  He had unfortunately just been told earlier that day that his mother had passed away from a myocardial infarction.  Mr. Arambula tells me has had symptoms in the past of some palpitations related to his heart and fast heart rate, but never a real description of chest pains before.  He was fairly active up until his daughter was born, exercising on a regular basis.  In the last 8 months since she was born his exercise routine has somewhat fallen off.  Chest pain is a new experience for him.  He has not had anything like this in the past with exertion or at rest.  He has no known risk factors of high blood pressure, high cholesterol, diabetes, kidney disease or previous smoking history.  He did have an electrocardiogram performed on 04/07 which I have reviewed.  This essentially demonstrates a normal sinus rhythm.  There is some minor T-wave abnormalities in lead III and aVF as well as an incomplete right bundle branch block or RSR pattern in V1.  I do not have a previous available for comparison.  His stress echo, he was able to work through a high cardiac workload at 15 minutes on a standard [place] protocol.  He did have a maximum of 1 mm of ST segment depression noted in both the inferior and anterior leads; however, this recovered within 1 minute of the recovery period.  He did not experience any reproducible symptoms during the test.  His blood pressure was normal and the stress echocardiogram portion was also normal without evidence of stress-induced wall motion abnormality.  He was recommended to follow up in clinic today to discuss further his symptoms and any other possible evaluation.  It was suggested to him he may benefit from a heart scan.      PHYSICAL  EXAMINATION:   VITAL SIGNS:  On exam today, his blood pressure was 128/86, pulse is 80, weight 177, body mass index of 22.   NECK:  Carotid upstrokes are brisk without bruit.   CARDIOVASCULAR:  Tones are regular.  I do not appreciate a murmur, gallop or rub.   LUNGS:  Clear posteriorly.   EXTREMITIES:  He has no peripheral edema with strong and symmetric pulses in the upper and lower extremities.      SUMMARY:  Mr. Arambula is a very pleasant 41-year-old male with atypical chest pain symptoms, mildly abnormal stress ECG but normal stress echocardiogram.  His baseline ECG is mildly abnormal as well.  He did exercise to a high cardiac workload without reproducible symptoms.  Overall, the testing in his history suggests low risk for future cardiovascular events.  This stress echo indicates that likelihood of his current chest pain symptoms are very low to be related to an ongoing active cardiac problem.  However, we do see patients often after a sudden family heart event and there is a second question that arises as to what are his future risks for development of cardiovascular disease and we did talk about that today. Heart scan may be a very good option for him to get an idea of what his vascular age is and see if there is any evidence of subclinical coronary disease.  I did offer him a CT calcium scoring for this reason and he would like to undergo this, so we will go ahead and order it for him.  I did explain that it is likely not covered by insurance, but gave him an estimated cost today as well as a followup visit to go over its results.  We did discuss a little bit about his mom and her symptoms preceding her passing.  We also talked about lifestyle and ways to reduce risks for future cardiovascular events.        Please feel free to contact me with any questions you have in regards to his care.         NEFTALY MORGAN DO             D: 04/12/2017 16:11   T: 04/13/2017 15:21   MT: EDITH      Name:     SANGEETHATACHO    MRN:      [id number]        Account:      [id number]   :      1975           Service Date: 2017      Document: [id number]

## 2017-04-24 ENCOUNTER — HOSPITAL ENCOUNTER (OUTPATIENT)
Dept: CARDIOLOGY | Facility: CLINIC | Age: 42
Discharge: HOME | End: 2017-04-24
Attending: INTERNAL MEDICINE | Admitting: INTERNAL MEDICINE
Payer: COMMERCIAL

## 2017-04-24 DIAGNOSIS — Z82.49 FAMILY HISTORY OF ISCHEMIC HEART DISEASE: ICD-10-CM

## 2017-04-24 DIAGNOSIS — R07.89 ATYPICAL CHEST PAIN: ICD-10-CM

## 2017-04-24 DIAGNOSIS — Z13.6 CARDIOVASCULAR SCREENING; LDL GOAL LESS THAN 160: ICD-10-CM

## 2017-04-24 PROCEDURE — 75571 CT HRT W/O DYE W/CA TEST: CPT | Mod: GA

## 2017-04-24 PROCEDURE — 75571 CT HRT W/O DYE W/CA TEST: CPT | Mod: 26 | Performed by: INTERNAL MEDICINE

## 2017-05-01 ENCOUNTER — OFFICE VISIT (OUTPATIENT)
Dept: CARDIOLOGY | Facility: CLINIC | Age: 42
End: 2017-05-01
Attending: INTERNAL MEDICINE
Payer: COMMERCIAL

## 2017-05-01 VITALS
DIASTOLIC BLOOD PRESSURE: 68 MMHG | WEIGHT: 182 LBS | HEIGHT: 74 IN | SYSTOLIC BLOOD PRESSURE: 118 MMHG | BODY MASS INDEX: 23.36 KG/M2 | HEART RATE: 76 BPM

## 2017-05-01 DIAGNOSIS — Z82.49 FAMILY HISTORY OF ISCHEMIC HEART DISEASE: ICD-10-CM

## 2017-05-01 DIAGNOSIS — Z13.6 CARDIOVASCULAR SCREENING; LDL GOAL LESS THAN 160: ICD-10-CM

## 2017-05-01 DIAGNOSIS — R07.89 ATYPICAL CHEST PAIN: ICD-10-CM

## 2017-05-01 PROCEDURE — 99213 OFFICE O/P EST LOW 20 MIN: CPT | Performed by: NURSE PRACTITIONER

## 2017-05-01 NOTE — MR AVS SNAPSHOT
"              After Visit Summary   2017    Hugh Arambula    MRN: [id number]           Patient Information     Date Of Birth          1975        Visit Information        Provider Department      2017 8:10 AM Sil Pickard APRN CNP [facility]        Today's Diagnoses     Atypical chest pain        CARDIOVASCULAR SCREENING; LDL GOAL LESS THAN 160        Family history of ischemic heart disease           Follow-ups after your visit        Who to contact     If you have questions or need follow up information about today's clinic visit or your schedule please contact [facility] directly at [phone].  Normal or non-critical lab and imaging results will be communicated to you by [employer], letter or phone within 4 business days after the clinic has received the results. If you do not hear from us within 7 days, please contact the clinic through [employer] or phone. If you have a critical or abnormal lab result, we will notify you by phone as soon as possible.  Submit refill requests through [email or web address] or call your pharmacy and they will forward the refill request to us. Please allow 3 business days for your refill to be completed.          Additional Information About Your Visit        MyChart Information     [email or web address] lets you send messages to your doctor, view your test results, renew your prescriptions, schedule appointments and more. To sign up, go to [email or web address] . Click on \"Log in\" on the left side of the screen, which will take you to the Welcome page. Then click on \"Sign up Now\" on the right side of the page.     You will be asked to enter the access code listed below, as well as some personal information. Please follow the directions to create your username and password.     Your access code is: [email or web address]  Expires: 2017 10:51 PM     Your access code will  in 90 days. If you need help or " "a new code, please call your [place] clinic or [phone].        Care EveryWhere ID     This is your Care EveryWhere ID. This could be used by other organizations to access your [place] medical records  [id number]        Your Vitals Were     Pulse Height BMI (Body Mass Index)             76 1.88 m (6' 2\") 23.37 kg/m2          Blood Pressure from Last 3 Encounters:   05/01/17 118/68   04/12/17 128/86   04/07/17 140/80    Weight from Last 3 Encounters:   05/01/17 82.6 kg (182 lb)   04/12/17 80.5 kg (177 lb 8 oz)   04/07/17 81.4 kg (179 lb 7.3 oz)              We Performed the Following     Follow-Up with Cardiac Advanced Practice Provider        Primary Care Provider    None       No address on file        Thank you!     Thank you for choosing [facility] [place]  for your care. Our goal is always to provide you with excellent care. Hearing back from our patients is one way we can continue to improve our services. Please take a few minutes to complete the written survey that you may receive in the mail after your visit with us. Thank you!             Your Updated Medication List - Protect others around you: Learn how to safely use, store and throw away your medicines at www.disposemymeds.org.          This list is accurate as of: 5/1/17 10:49 AM.  Always use your most recent med list.                   Brand Name Dispense Instructions for use    ascorbic acid 1000 MG Tabs    vitamin C     Take 1,000 mg by mouth daily       aspirin 325 MG EC tablet      Take by mouth daily       melatonin 3 MG tablet      Take by mouth nightly as needed for sleep       OMEGA 3 PO      Take by mouth daily       PROBIOTIC ADVANCED PO      Take by mouth daily       VITAMIN B-12 PO      Vitamin B12 patch, 2 days month       vitamin D3 1000 UNITS Caps      Take by mouth daily         "

## 2017-05-01 NOTE — PROGRESS NOTES
HPI and Plan:   See dictation    No orders of the defined types were placed in this encounter.      No orders of the defined types were placed in this encounter.      Medications Discontinued During This Encounter   Medication Reason     ranitidine (ZANTAC) 150 MG tablet Stopped by Patient         Encounter Diagnoses   Name Primary?     Atypical chest pain      CARDIOVASCULAR SCREENING; LDL GOAL LESS THAN 160      Family history of ischemic heart disease        CURRENT MEDICATIONS:  Current Outpatient Prescriptions   Medication Sig Dispense Refill     aspirin 325 MG EC tablet Take by mouth daily       Omega-3 Fatty Acids (OMEGA 3 PO) Take by mouth daily       Cyanocobalamin (VITAMIN B-12 PO) Vitamin B12 patch, 2 days month       Cholecalciferol (VITAMIN D3) 1000 UNITS CAPS Take by mouth daily       melatonin 3 MG tablet Take by mouth nightly as needed for sleep       Probiotic Product (PROBIOTIC ADVANCED PO) Take by mouth daily       ascorbic acid (VITAMIN C) 1000 MG TABS Take 1,000 mg by mouth daily         ALLERGIES     Allergies   Allergen Reactions     No Known Drug Allergies        PAST MEDICAL HISTORY:  Past Medical History:   Diagnosis Date     Atypical chest pain 4/10/2017       PAST SURGICAL HISTORY:  History reviewed. No pertinent surgical history.    FAMILY HISTORY:  Family History   Problem Relation Age of Onset     Hypertension Mother      Hyperlipidemia Mother        SOCIAL HISTORY:  Social History     Social History     Marital status:      Spouse name: N/A     Number of children: N/A     Years of education: N/A     Social History Main Topics     Smoking status: Never Smoker     Smokeless tobacco: None     Alcohol use Yes      Comment: 3 beers week     Drug use: No     Sexual activity: Yes     Partners: Female     Birth control/ protection: Condom     Other Topics Concern     None     Social History Narrative       Review of Systems:  Skin:  Negative       Eyes:  Positive for contacts    ENT:   "Positive for   sore throat  Respiratory:  Negative       Cardiovascular:  Negative;chest pain      Gastroenterology: Positive for reflux    Genitourinary:  Negative      Musculoskeletal:  Positive for   rib area pain  Neurologic:  Negative      Psychiatric:  Positive for anxiety states feels has panic attacks, mother passed away last week  Heme/Lymph/Imm:  Negative      Endocrine:  Negative        Physical Exam:  Vitals: /68  Pulse 76  Ht 1.88 m (6' 2\")  Wt 82.6 kg (182 lb)  BMI 23.37 kg/m2    Constitutional:  cooperative;alert and oriented        Skin:  warm and dry to the touch        Head:  normocephalic        Eyes:  pupils equal and round        ENT:  no pallor or cyanosis        Neck:  carotid pulses are full and equal bilaterally        Chest:             Cardiac: regular rhythm, normal S1/S2, no S3 or S4, apical impulse not displaced, no murmurs, gallops or rubs                  Abdomen:           Vascular: pulses full and equal                                        Extremities and Back:  no deformities, clubbing, cyanosis, erythema observed;no edema              Neurological:  affect appropriate, oriented to time, person and place              CC  Arpita Emmanuel, DO   [facility]  [id number]  [place] [id number]              "

## 2017-05-01 NOTE — PROGRESS NOTES
HISTORY OF PRESENT ILLNESS:  I had the pleasure of meeting Mr. Hugh Arambula in [facility]iology [facility] to review the results of his calcium score.  He is a pleasant 41-year-old patient Dr. Emmanuel saw after he had been in the ER with chest tightness and shortness of breath on the same day that he had been told that his mother had passed away from myocardial infarction.  He has a remote history of an episode of palpitations with a faster heart rate.  His stress echocardiogram was a normal stress echocardiogram.  He exercised for 15 minutes.  He had a maximum of 1 mm of ST segment depression in both inferior and anterior leads but recovered within 1 minute.  He did not have any reproducible symptoms during the test and his blood pressure was normal as well.  His only known risk factor is that his mother and perhaps grandfather  of coronary disease.  He tells me his cholesterol has been well controlled but I do not have those labs.  He is a vegan.      Dr. Emmanuel recommended a CT coronary calcium score which was excellent since it was 0.  It was a good study.  He had 0 calcium found in the left main, the left anterior descending, the circumflex and the right coronary artery.  Radiology reported no significant soft tissue abnormality in the visualized portions of the chest.  There was a 2 mm granuloma noted in the periphery of the right lower lobe but no other pulmonary nodule or mass or thoracic adenopathy or pleural or pericardial effusion.      Today Hugh says his symptoms are gone for the most part.  He thinks that some of the chest discomfort he had might have been related to the fact that he had recently started doing pushups, and in general when he coughs, he gets discomfort in his sternal area. He thinks the pushups he did the day prior to going to the ED may have aggravated it.       PHYSICAL EXAMINATION:   GENERAL:  A 41-year-old male, appears fit.   VITAL SIGNS:  Blood pressure 118/68, heart rate 76, weight 182  pounds, BMI of 23.4.   LUNGS:  Clear throughout to auscultation.   CARDIOVASCULAR:  Rate is regular, normal S1, S2.   EXTREMITIES:  No lower extremity edema.      ASSESSMENT AND PLAN:   1.  Atypical chest pain symptoms with a normal stress echocardiogram and a CT calcium score of 0. Together these suggests a low risk for future cardiovascular events.  In terms of recommendations, I do not see an added benefit for him being on the 325 mg aspirin he prophylacticaly started.  I told him that if he wants to continue an aspirin I would not do more than a very low dose of 81 mg but that there is no cardiac reason to be on it.  Other things are his blood pressure is well controlled.  His BMI is less than 24.  He should continue to exercise regularly.  I do not have his lipids, but presumably he does not have hyperlipidemia based on what he tells me.  He does have a history of palpitations, he said one morning a long time ago he woke up with his heart racing and his Fitbit told him it was 120 beats per minute.  By the time he got evaluated, it had subsided.  He has not had any recurrence of this.  I told him that if he does have recurrent symptoms of palpitations that we could do a monitor of some sort but it has to happen with enough frequency for us to capture it.  If he is interested in this in the future, we can order one based on how frequent he is having his symptoms.  Otherwise, he does not need to follow up with Cardiology.         YOLANDA DELAROSA, DAX             D: 2017 10:49   T: 2017 13:15   MT: palmira      Name:     TACHO VIVAS   MRN:      [id number]        Account:      [id number]   :      1975           Service Date: 2017      Document: [id number]

## 2019-11-06 ENCOUNTER — HEALTH MAINTENANCE LETTER (OUTPATIENT)
Age: 44
End: 2019-11-06

## 2020-11-29 ENCOUNTER — HEALTH MAINTENANCE LETTER (OUTPATIENT)
Age: 45
End: 2020-11-29

## 2022-01-09 ENCOUNTER — HEALTH MAINTENANCE LETTER (OUTPATIENT)
Age: 47
End: 2022-01-09

## 2023-04-16 ENCOUNTER — HEALTH MAINTENANCE LETTER (OUTPATIENT)
Age: 48
End: 2023-04-16